# Patient Record
Sex: FEMALE | Race: WHITE | NOT HISPANIC OR LATINO | Employment: OTHER | RURAL
[De-identification: names, ages, dates, MRNs, and addresses within clinical notes are randomized per-mention and may not be internally consistent; named-entity substitution may affect disease eponyms.]

---

## 2024-09-16 ENCOUNTER — OFFICE VISIT (OUTPATIENT)
Dept: CARDIOLOGY | Facility: CLINIC | Age: 64
End: 2024-09-16
Payer: MEDICARE

## 2024-09-16 ENCOUNTER — TELEPHONE (OUTPATIENT)
Dept: CARDIOLOGY | Facility: CLINIC | Age: 64
End: 2024-09-16

## 2024-09-16 VITALS
SYSTOLIC BLOOD PRESSURE: 152 MMHG | BODY MASS INDEX: 31.49 KG/M2 | WEIGHT: 166.8 LBS | DIASTOLIC BLOOD PRESSURE: 95 MMHG | HEIGHT: 61 IN | HEART RATE: 75 BPM

## 2024-09-16 DIAGNOSIS — I10 ESSENTIAL HYPERTENSION: ICD-10-CM

## 2024-09-16 DIAGNOSIS — R94.31 ABNORMAL EKG: Primary | ICD-10-CM

## 2024-09-16 DIAGNOSIS — Z01.810 PREOP CARDIOVASCULAR EXAM: ICD-10-CM

## 2024-09-16 PROCEDURE — 1159F MED LIST DOCD IN RCRD: CPT | Performed by: INTERNAL MEDICINE

## 2024-09-16 PROCEDURE — 1160F RVW MEDS BY RX/DR IN RCRD: CPT | Performed by: INTERNAL MEDICINE

## 2024-09-16 PROCEDURE — 99204 OFFICE O/P NEW MOD 45 MIN: CPT | Performed by: INTERNAL MEDICINE

## 2024-09-16 RX ORDER — POTASSIUM CHLORIDE 750 MG/1
10 TABLET, EXTENDED RELEASE ORAL 2 TIMES DAILY
COMMUNITY

## 2024-09-16 RX ORDER — TRAZODONE HYDROCHLORIDE 50 MG/1
50 TABLET, FILM COATED ORAL NIGHTLY
COMMUNITY
Start: 2024-05-09

## 2024-09-16 RX ORDER — CHLORTHALIDONE 25 MG/1
1 TABLET ORAL DAILY
COMMUNITY
Start: 2024-05-22

## 2024-09-16 RX ORDER — FERROUS SULFATE 325(65) MG
325 TABLET ORAL
COMMUNITY

## 2024-09-16 RX ORDER — AMLODIPINE BESYLATE 5 MG/1
5 TABLET ORAL DAILY
COMMUNITY

## 2024-10-09 ENCOUNTER — TELEPHONE (OUTPATIENT)
Dept: CARDIOLOGY | Facility: CLINIC | Age: 64
End: 2024-10-09
Payer: MEDICARE

## 2024-10-09 DIAGNOSIS — R94.31 ABNORMAL EKG: ICD-10-CM

## 2024-10-09 DIAGNOSIS — Z01.810 PREOP CARDIOVASCULAR EXAM: ICD-10-CM

## 2024-10-09 NOTE — TELEPHONE ENCOUNTER
Per Dr. Nelson:    This patient is low cardiovascular risk for knee surgery, please send clearance to Dr. Brewer and notify the patient. She can be followed as needed

## 2024-10-09 NOTE — TELEPHONE ENCOUNTER
----- Message from CAMILO Nelson sent at 10/9/2024  8:42 AM EDT -----  Echo reviewed  Heart function was normal  Valve function was normal, just trace tricuspid regurgitation which is not clinically significant    This patient is low cardiovascular risk for knee surgery, please send clearance to Dr. Brewer and notify the patient.  She can be followed as needed

## 2024-10-14 ENCOUNTER — OFFICE VISIT (OUTPATIENT)
Dept: CARDIOLOGY | Facility: CLINIC | Age: 64
End: 2024-10-14
Payer: MEDICARE

## 2024-10-14 VITALS
SYSTOLIC BLOOD PRESSURE: 138 MMHG | HEIGHT: 61 IN | BODY MASS INDEX: 31.38 KG/M2 | DIASTOLIC BLOOD PRESSURE: 79 MMHG | WEIGHT: 166.2 LBS | HEART RATE: 78 BPM

## 2024-10-14 DIAGNOSIS — R94.31 ABNORMAL EKG: Primary | ICD-10-CM

## 2024-10-14 DIAGNOSIS — I10 ESSENTIAL HYPERTENSION: ICD-10-CM

## 2024-10-14 PROCEDURE — G2211 COMPLEX E/M VISIT ADD ON: HCPCS | Performed by: NURSE PRACTITIONER

## 2024-10-14 PROCEDURE — 1159F MED LIST DOCD IN RCRD: CPT | Performed by: NURSE PRACTITIONER

## 2024-10-14 PROCEDURE — 99214 OFFICE O/P EST MOD 30 MIN: CPT | Performed by: NURSE PRACTITIONER

## 2024-10-14 PROCEDURE — 1160F RVW MEDS BY RX/DR IN RCRD: CPT | Performed by: NURSE PRACTITIONER

## 2024-10-14 RX ORDER — IBUPROFEN 800 MG/1
1 TABLET, FILM COATED ORAL 3 TIMES DAILY
COMMUNITY
Start: 2024-10-10

## 2024-10-14 NOTE — PROGRESS NOTES
Chief Complaint  Abnormal ECG and Follow-up (Bp check )    Subjective            Pamela Iverson presents to [facility]  History of Present Illness    Ms. Iverson is here for blood pressure check.  She presented to the office last month due to abnormal EKG and needing preoperative clearance for knee surgery.  Subsequent echocardiogram was normal, with normal LV function and no wall motion abnormalities.  She was cleared for surgery however this has been put on hold due to anemia.  She has no cardiac complaints today.  She denies chest pain or excessive shortness of breath.  She has not been routinely monitoring her blood pressure at home.    PMH  Past Medical History:   Diagnosis Date    Abnormal ECG     Heart murmur     Hypertension          SURGICALHX  History reviewed. No pertinent surgical history.     SOC  Social History     Socioeconomic History    Marital status:    Tobacco Use    Smoking status: Never    Smokeless tobacco: Never   Vaping Use    Vaping status: Never Used   Substance and Sexual Activity    Alcohol use: Yes     Comment: wine nightly    Drug use: Never    Sexual activity: Defer         FAMHX  Family History   Problem Relation Age of Onset    No Known Problems Mother     No Known Problems Father           ALLERGY  Allergies   Allergen Reactions    Phenobarbital Other (See Comments)    Prochlorperazine Arrhythmia, Nausea Only, Unknown - Low Severity, Other (See Comments) and Shortness Of Breath    Amoxicillin-Pot Clavulanate Nausea Only, Unknown - Low Severity, Other (See Comments) and Rash        MEDSCURRENT    Current Outpatient Medications:     amLODIPine (NORVASC) 5 MG tablet, Take 1 tablet by mouth Daily., Disp: , Rfl:     chlorthalidone (HYGROTON) 25 MG tablet, Take 1 tablet by mouth Daily., Disp: , Rfl:     ferrous sulfate 325 (65 FE) MG tablet, Take 1 tablet by mouth Daily With Breakfast., Disp: , Rfl:     ibuprofen (ADVIL,MOTRIN) 800 MG tablet, Take 1 tablet  "by mouth 3 times a day., Disp: , Rfl:     omeprazole (priLOSEC) 20 MG capsule, Take 1 capsule by mouth Daily., Disp: , Rfl:     potassium chloride 10 MEQ CR tablet, Take 1 tablet by mouth 2 (Two) Times a Day., Disp: , Rfl:     traZODone (DESYREL) 50 MG tablet, Take 1 tablet by mouth Every Night., Disp: , Rfl:       Review of Systems   Cardiovascular:  Negative for chest pain, dyspnea on exertion and palpitations.        Objective     /79   Pulse 78   Ht 154.9 cm (61\")   Wt 75.4 kg (166 lb 3.2 oz)   BMI 31.40 kg/m²       General Appearance:   well developed  well nourished  HENT:   oropharynx moist  lips not cyanotic  Neck:  thyroid not enlarged  supple  Respiratory:  no respiratory distress  normal breath sounds  no rales  Cardiovascular:  no jugular venous distention  regular rhythm  apical impulse normal  S1 normal, S2 normal  no S3, no S4   no murmur  no rub, no thrill  carotid pulses normal; no bruit  pedal pulses normal  lower extremity edema: none    Musculoskeletal:  no clubbing of fingers.   normocephalic, head atraumatic  Skin:   warm, dry  Psychiatric:  judgement and insight appropriate  normal mood and affect      Result Review :     The following data was reviewed by: JUAN Pineda on 10/14/2024:          Data reviewed : Cardiology studies EKG and echo reviewed.  EKG showed sinus rhythm with anterior Q waves with no previous comparison.    Procedures      Pamela Iverson  reports that she has never smoked. She has never used smokeless tobacco. I have educated her on the risk of diseases from using tobacco products such as cancer, COPD, and heart disease.              Assessment and Plan        ASSESSMENT:  Encounter Diagnoses   Name Primary?    Abnormal EKG Yes    Essential hypertension          PLAN:    1.  Abnormal EKG, the EKG showed decreased anterior forces possibly due to lead placement.  Subsequent echocardiogram did not show any focal wall motion abnormalities or signs of " previous infarct.  She had normal LV function.  No additional workup necessary.  2.  Essential hypertension, improved reading today.  I encouraged her to spot check her blood pressure 2-3 times a week at home, goal blood pressure less than 140/90.  She will send a [employer] message in a few weeks and let us know what her blood pressure is averaging.  Continue amlodipine 5 mg daily and chlorthalidone 25 mg daily.  3.  Follow-up as needed otherwise.          Patient was given instructions and counseling regarding her condition or for health maintenance advice. Please see specific information pulled into the AVS if appropriate.           Nilda Sousa, APRN   10/14/2024  12:00 EDT